# Patient Record
Sex: MALE | Race: BLACK OR AFRICAN AMERICAN | NOT HISPANIC OR LATINO | URBAN - METROPOLITAN AREA
[De-identification: names, ages, dates, MRNs, and addresses within clinical notes are randomized per-mention and may not be internally consistent; named-entity substitution may affect disease eponyms.]

---

## 2019-10-09 RX ORDER — AZITHROMYCIN 500 MG/1
1 TABLET, FILM COATED ORAL
Qty: 0 | Refills: 0 | DISCHARGE
Start: 2019-10-09

## 2019-10-11 ENCOUNTER — INPATIENT (INPATIENT)
Facility: HOSPITAL | Age: 44
LOS: 1 days | Discharge: HOME | DRG: 977 | End: 2019-10-13
Attending: INTERNAL MEDICINE | Admitting: INTERNAL MEDICINE
Payer: COMMERCIAL

## 2019-10-11 VITALS
HEART RATE: 102 BPM | TEMPERATURE: 103 F | OXYGEN SATURATION: 100 % | SYSTOLIC BLOOD PRESSURE: 119 MMHG | DIASTOLIC BLOOD PRESSURE: 76 MMHG | RESPIRATION RATE: 18 BRPM

## 2019-10-11 DIAGNOSIS — R50.9 FEVER, UNSPECIFIED: ICD-10-CM

## 2019-10-11 DIAGNOSIS — R63.8 OTHER SYMPTOMS AND SIGNS CONCERNING FOOD AND FLUID INTAKE: ICD-10-CM

## 2019-10-11 DIAGNOSIS — D64.9 ANEMIA, UNSPECIFIED: ICD-10-CM

## 2019-10-11 DIAGNOSIS — R65.10 SYSTEMIC INFLAMMATORY RESPONSE SYNDROME (SIRS) OF NON-INFECTIOUS ORIGIN WITHOUT ACUTE ORGAN DYSFUNCTION: ICD-10-CM

## 2019-10-11 LAB
APPEARANCE UR: CLEAR — SIGNIFICANT CHANGE UP
BILIRUB UR-MCNC: NEGATIVE — SIGNIFICANT CHANGE UP
COLOR SPEC: YELLOW — SIGNIFICANT CHANGE UP
CRP SERPL-MCNC: 0.77 MG/DL — HIGH (ref 0–0.4)
D DIMER BLD IA.RAPID-MCNC: 1576 NG/ML DDU — HIGH
DIFF PNL FLD: NEGATIVE — SIGNIFICANT CHANGE UP
ERYTHROCYTE [SEDIMENTATION RATE] IN BLOOD: 19 MM/HR — HIGH
FERRITIN SERPL-MCNC: 548 NG/ML — HIGH (ref 30–400)
FLU A RESULT: SIGNIFICANT CHANGE UP
FLU A RESULT: SIGNIFICANT CHANGE UP
FLUAV AG NPH QL: SIGNIFICANT CHANGE UP
FLUBV AG NPH QL: SIGNIFICANT CHANGE UP
GLUCOSE UR QL: NEGATIVE — SIGNIFICANT CHANGE UP
HETEROPH AB TITR SER AGGL: NEGATIVE — SIGNIFICANT CHANGE UP
IRON SATN MFR SERPL: 22 % — SIGNIFICANT CHANGE UP (ref 16–55)
IRON SATN MFR SERPL: 37 UG/DL — LOW (ref 45–165)
KETONES UR-MCNC: NEGATIVE — SIGNIFICANT CHANGE UP
LEUKOCYTE ESTERASE UR-ACNC: NEGATIVE — SIGNIFICANT CHANGE UP
NITRITE UR-MCNC: NEGATIVE — SIGNIFICANT CHANGE UP
PH UR: 7 — SIGNIFICANT CHANGE UP (ref 5–8)
PROT UR-MCNC: NEGATIVE MG/DL — SIGNIFICANT CHANGE UP
RETICS #: 50.9 K/UL — SIGNIFICANT CHANGE UP (ref 25–125)
RETICS/RBC NFR: 1.3 % — SIGNIFICANT CHANGE UP (ref 0.5–2.5)
RSV RESULT: SIGNIFICANT CHANGE UP
RSV RNA RESP QL NAA+PROBE: SIGNIFICANT CHANGE UP
S PYO AG SPEC QL IA: NEGATIVE — SIGNIFICANT CHANGE UP
SP GR SPEC: 1.01 — SIGNIFICANT CHANGE UP (ref 1–1.03)
TIBC SERPL-MCNC: 171 UG/DL — LOW (ref 220–430)
UIBC SERPL-MCNC: 134 UG/DL — SIGNIFICANT CHANGE UP (ref 110–370)
UROBILINOGEN FLD QL: 2 E.U./DL

## 2019-10-11 PROCEDURE — 99223 1ST HOSP IP/OBS HIGH 75: CPT | Mod: GC

## 2019-10-11 PROCEDURE — 71046 X-RAY EXAM CHEST 2 VIEWS: CPT | Mod: 26

## 2019-10-11 PROCEDURE — 99285 EMERGENCY DEPT VISIT HI MDM: CPT

## 2019-10-11 RX ORDER — AZITHROMYCIN 500 MG/1
500 TABLET, FILM COATED ORAL ONCE
Refills: 0 | Status: COMPLETED | OUTPATIENT
Start: 2019-10-11 | End: 2019-10-11

## 2019-10-11 RX ORDER — CEFTRIAXONE 500 MG/1
1000 INJECTION, POWDER, FOR SOLUTION INTRAMUSCULAR; INTRAVENOUS ONCE
Refills: 0 | Status: COMPLETED | OUTPATIENT
Start: 2019-10-11 | End: 2019-10-11

## 2019-10-11 RX ORDER — KETOROLAC TROMETHAMINE 30 MG/ML
30 SYRINGE (ML) INJECTION ONCE
Refills: 0 | Status: DISCONTINUED | OUTPATIENT
Start: 2019-10-11 | End: 2019-10-11

## 2019-10-11 RX ORDER — SODIUM CHLORIDE 9 MG/ML
2500 INJECTION INTRAMUSCULAR; INTRAVENOUS; SUBCUTANEOUS ONCE
Refills: 0 | Status: COMPLETED | OUTPATIENT
Start: 2019-10-11 | End: 2019-10-11

## 2019-10-11 RX ORDER — AZITHROMYCIN 500 MG/1
500 TABLET, FILM COATED ORAL EVERY 24 HOURS
Refills: 0 | Status: DISCONTINUED | OUTPATIENT
Start: 2019-10-12 | End: 2019-10-12

## 2019-10-11 RX ORDER — CALCIUM CARBONATE 500(1250)
3 TABLET ORAL EVERY 6 HOURS
Refills: 0 | Status: DISCONTINUED | OUTPATIENT
Start: 2019-10-11 | End: 2019-10-13

## 2019-10-11 RX ORDER — CEFTRIAXONE 500 MG/1
1000 INJECTION, POWDER, FOR SOLUTION INTRAMUSCULAR; INTRAVENOUS EVERY 24 HOURS
Refills: 0 | Status: DISCONTINUED | OUTPATIENT
Start: 2019-10-12 | End: 2019-10-12

## 2019-10-11 RX ORDER — ENOXAPARIN SODIUM 100 MG/ML
40 INJECTION SUBCUTANEOUS EVERY 24 HOURS
Refills: 0 | Status: DISCONTINUED | OUTPATIENT
Start: 2019-10-11 | End: 2019-10-12

## 2019-10-11 RX ORDER — ACETAMINOPHEN 500 MG
1000 TABLET ORAL ONCE
Refills: 0 | Status: COMPLETED | OUTPATIENT
Start: 2019-10-11 | End: 2019-10-11

## 2019-10-11 RX ORDER — POTASSIUM CHLORIDE 20 MEQ
40 PACKET (EA) ORAL EVERY 4 HOURS
Refills: 0 | Status: COMPLETED | OUTPATIENT
Start: 2019-10-11 | End: 2019-10-12

## 2019-10-11 RX ORDER — ACETAMINOPHEN 500 MG
650 TABLET ORAL EVERY 4 HOURS
Refills: 0 | Status: DISCONTINUED | OUTPATIENT
Start: 2019-10-11 | End: 2019-10-13

## 2019-10-11 RX ORDER — LANOLIN ALCOHOL/MO/W.PET/CERES
5 CREAM (GRAM) TOPICAL AT BEDTIME
Refills: 0 | Status: DISCONTINUED | OUTPATIENT
Start: 2019-10-11 | End: 2019-10-13

## 2019-10-11 RX ADMIN — AZITHROMYCIN 255 MILLIGRAM(S): 500 TABLET, FILM COATED ORAL at 19:35

## 2019-10-11 RX ADMIN — Medication 1000 MILLIGRAM(S): at 18:46

## 2019-10-11 RX ADMIN — SODIUM CHLORIDE 2500 MILLILITER(S): 9 INJECTION INTRAMUSCULAR; INTRAVENOUS; SUBCUTANEOUS at 16:51

## 2019-10-11 RX ADMIN — Medication 30 MILLIGRAM(S): at 18:46

## 2019-10-11 RX ADMIN — Medication 40 MILLIEQUIVALENT(S): at 22:54

## 2019-10-11 RX ADMIN — Medication 3 TABLET(S): at 22:55

## 2019-10-11 RX ADMIN — ENOXAPARIN SODIUM 40 MILLIGRAM(S): 100 INJECTION SUBCUTANEOUS at 22:54

## 2019-10-11 RX ADMIN — CEFTRIAXONE 100 MILLIGRAM(S): 500 INJECTION, POWDER, FOR SOLUTION INTRAMUSCULAR; INTRAVENOUS at 18:57

## 2019-10-11 RX ADMIN — Medication 30 MILLIGRAM(S): at 16:56

## 2019-10-11 RX ADMIN — CEFTRIAXONE 1000 MILLIGRAM(S): 500 INJECTION, POWDER, FOR SOLUTION INTRAMUSCULAR; INTRAVENOUS at 19:35

## 2019-10-11 RX ADMIN — Medication 1000 MILLIGRAM(S): at 16:55

## 2019-10-11 RX ADMIN — Medication 5 MILLIGRAM(S): at 22:54

## 2019-10-11 NOTE — H&P ADULT - ASSESSMENT
Mr Bae is a 45 yo M with fever of unknown origin. He has traveled in areas where tick exposure is possible, and he was treated, perhaps not adequately, for positive Streptococcus throat swab several weeks ago. He was exposed to steroids once, when he developed a rash after 10 days on Augmentin. Given his lack of localizing symptoms, and ongoing fever (102.9 oral), he is being admitted for SIRS and workup of fever of unknown origin.

## 2019-10-11 NOTE — ED PROVIDER NOTE - OBJECTIVE STATEMENT
44 year old male with no past medical history on no medications presents to ED with concern for fever intermittently x 6 weeks.  Patient notes he thought perhaps he had PNA and or strep throat and was prescribed 10 day course of amoxicillin without significant improvement in symptoms.  He was later prescribed azithromycin, which he has been taking without improvement in symptoms.   He notes prior throat discomfort, which he feels is now improved.  He denies associated headache, neck pain/stiffness, chills, chest pain, shortness of breath, cough, abdominal pain, nausea, emesis, changes to bowel movements, rashes, recent travel or additional acute complaints or concerns at this time.

## 2019-10-11 NOTE — H&P ADULT - HISTORY OF PRESENT ILLNESS
Mr. Bae is a 43 yo M with no significant past medical history who presents with a 6-week history of intermittent fever. He has had fevers at least every 2-3 days for the past 6 weeks. He does not recall any other symptoms, either when the fever first started, or throughout the duration. He presents now at the insistence of family because the fever has become high-grade and has been associated with fatigue and sweats. His only travel history was a trip to [place] which included being in wooded areas and on the beach. He has had no international travel in the last 10 years. He works from home. His only sick contacts during this duration were when his stepchildren caught Strept throat; he was swabbed and was Strept positive at the time, and was treated with at 10-day course of Augmentin. His fever seemed to improve on the Augmentin, but then he had a rash consistent with either hives or urticaria all over his body, at which point he stopped the Augmentin, and took a dose of prednisone, after which the rash disappeared. Two days ago, he was started on a course of azithromycin, which he has been taking without any improvement. Throughout the course of his illness, patient denies night sweats, weight loss, mouth sores or ulcers, sore throat, rhinorrhea, cough, sputum production, hemoptysis, nausea, vomiting, diarrhea, dysuria, genital lesions, or any other skin rashes or lesions. Mr. Bae is a 43 yo M with no significant past medical history who presents with a 6-week history of intermittent fever. He has had fevers at least every 2-3 days for the past 6 weeks. He does not recall any other symptoms, either when the fever first started, or throughout the duration. He presents now at the insistence of family because the fever has become high-grade and has been associated with fatigue and sweats. His only travel history was a trip to [place] which included being in wooded areas and on the beach. He has had no international travel in the last 10 years. He works from home. His only sick contacts during this duration were when his stepchildren caught Strept throat; he was swabbed and was Strept positive at the time, and was treated with at 10-day course of Augmentin. His fever seemed to improve on the Augmentin, but then he had a rash which spared face, palms and soles of feet, at which point he stopped the Augmentin, and took a dose of prednisone, after which the rash disappeared. Two days ago, he was started on a course of azithromycin, which he has been taking without any improvement. Throughout the course of his illness, patient denies night sweats, weight loss, mouth sores or ulcers, sore throat, rhinorrhea, cough, sputum production, hemoptysis, nausea, vomiting, diarrhea, dysuria, genital lesions, or any other skin rashes or lesions.     ED Course: Vitals 102.9 (oral), 102, 119/76, 18, 100% on RA. WBC 4.7, Hgb 12.3, K 3.3, UA +urobili but negative for UTI; rapid Strep, RSV, flu and mono screens all negative. CXR without consolidation. Received CTX/azithro, NS 2.5 L, Tylenol x1, Toradol 30 mg x1.

## 2019-10-11 NOTE — H&P ADULT - NSHPLABSRESULTS_GEN_ALL_CORE
.  LABS:                         12.3   4.72  )-----------( 171      ( 11 Oct 2019 15:52 )             36.6     10-11    141  |  101  |  14  ----------------------------<  91  3.3<L>   |  29  |  1.02    Ca    8.6      11 Oct 2019 15:53    TPro  6.9  /  Alb  3.9  /  TBili  0.7  /  DBili  x   /  AST  23  /  ALT  30  /  AlkPhos  54  10-11    PT/INR - ( 11 Oct 2019 15:52 )   PT: 13.2 sec;   INR: 1.16          PTT - ( 11 Oct 2019 15:52 )  PTT:29.4 sec  Urinalysis Basic - ( 11 Oct 2019 17:59 )    Color: Yellow / Appearance: Clear / S.015 / pH: x  Gluc: x / Ketone: NEGATIVE  / Bili: Negative / Urobili: 2.0 E.U./dL   Blood: x / Protein: NEGATIVE mg/dL / Nitrite: NEGATIVE   Leuk Esterase: NEGATIVE / RBC: x / WBC x   Sq Epi: x / Non Sq Epi: x / Bacteria: x            Lactate, Blood: 1.0 mmoL/L (10-11 @ 15:52)      RADIOLOGY, EKG & ADDITIONAL TESTS: None.

## 2019-10-11 NOTE — H&P ADULT - NSHPPHYSICALEXAM_GEN_ALL_CORE
VITAL SIGNS:  T(C): 36.8 (10-11-19 @ 20:54), Max: 39.4 (10-11-19 @ 15:21)  T(F): 98.3 (10-11-19 @ 20:54), Max: 102.9 (10-11-19 @ 15:21)  HR: 81 (10-11-19 @ 20:54) (81 - 102)  BP: 128/92 (10-11-19 @ 20:54) (108/63 - 128/92)  BP(mean): --  RR: 16 (10-11-19 @ 20:54) (16 - 18)  SpO2: 100% (10-11-19 @ 20:54) (100% - 100%)  Wt(kg): --    PHYSICAL EXAM:    Constitutional: Adult Black male, beads of sweat on brow, lying in bed comfortably; in no acute distress  Head: NC/AT  Eyes: PERRL, EOMI, anicteric sclera  ENT: MMM  Neck: no JVD  Respiratory: CTA B/L; no W/R/R  Cardiac: RRR  Gastrointestinal: soft, NT/ND  Extremities: WWP; no peripheral edema  Musculoskeletal: no joint swelling, tenderness or erythema  Dermatologic: skin warm, dry and intact; no rash  Neurologic: AAOx3; no gross focal deficits of cranial nerves; moves extremities x4 to command  Psychiatric: affect and characteristics of appearance, verbalizations, behaviors are appropriate

## 2019-10-11 NOTE — ED ADULT NURSE NOTE - OBJECTIVE STATEMENT
The pt is a 43 y/o male who came in to ED for evaluation of fever for a few days. Pt reports that he was being treated for strep and developed an allergy to amoxicillin, Pt then was placed on azithromycin and has not improved, pt presents febrile at triage at 102 and tachycardic at 102 bpm. Sepsis work up done.

## 2019-10-11 NOTE — ED ADULT TRIAGE NOTE - ARRIVAL INFO ADDITIONAL COMMENTS
Pt walked into ED with c/o of sorethroat, cough with yellow phlegm and fever. Onset was 6 weeks ago. Pt was treated with penicillin and broke out with rash, they switched to azithromycin with no relief of fever. Onset was for 6 weeks. Denies CP, SOB, neck stiffness, rash, pain, N+V+D. airway is intact. Denies traveling out of the state. Denies medical problems. Last tylenol last night

## 2019-10-11 NOTE — H&P ADULT - PROBLEM SELECTOR PLAN 4
F: s/p 2.5 L in ED  E: repleted K, replete PRN  N: regular diet po    DVT ppx: Lovenox qd  GI ppx: none    Dispo: [facility]  Code Status: Full Code

## 2019-10-11 NOTE — H&P ADULT - ATTENDING COMMENTS
Pt presenting with daily persistent fevers x6w and fatigue without other localizing symptoms. Pan review of systems negative. Reports recently being diagnosed with strep pharyngitis for which he took a course of a penicillin and discontinued on day 8 after developing a diffuse rash (which has since resolved). No recent travel. Febrile here on presentation to 39.4C, otherwise vitals stable. Exam benign except for mildly . Initial labs wihtout leukocytosis, UA negative. RVP negative. Rapid strep test negative. EBV monospot test negative. ESR/CRP mildly elevated. BMP/LFTs wnl. Ddimer was checked and elevated to 1576, subsequent CTPE negative for PE or infiltrates/effusions. CTA/P w/ IV contrast was without acute pathology (prelim read, there are cystic lesions in bilateral kidneys as well per my read, no obvious hepatosplenomegaly).     A/P  // FUO: some suspicion for CMV mononucleosis given rash after beta-lactam treatment  -HIV Ab, VL  -Peripheral smear  -Tick-bourne panel  -CMV serology  -BCx x2  -EKG  -PPD  -YOLANDA/RF    Rest of plan per residents note

## 2019-10-11 NOTE — H&P ADULT - PROBLEM SELECTOR PLAN 1
No leukocytosis. CXR, UA unrevealing. Swabs for RVP, flu, RSV and Strept negative. BCx sent x2.  - f/u mono screen, group A strept Cx sent in ED  - f/u HIV, acute hepatitis panel, tickborne illness panel, syphilis screen  - f/u EKG in setting of possible Lyme disease and recent Strept infection No leukocytosis. CXR, UA unrevealing. Swabs for RVP, flu, RSV, Strep and mono negative. BCx sent x2.  - f/u group A strept Cx sent in ED  - f/u HIV (CMIA and viral load), tickborne illness panel, syphilis screen, CMV IgG/IgM/PCR, EBV screen  - f/u EKG in setting of both possible Lyme disease as well as recent Strep infection

## 2019-10-11 NOTE — ED ADULT NURSE NOTE - NSIMPLEMENTINTERV_GEN_ALL_ED
Implemented All Universal Safety Interventions:  [place] to call system. Call bell, personal items and telephone within reach. Instruct patient to call for assistance. Room bathroom lighting operational. Non-slip footwear when patient is off stretcher. Physically safe environment: no spills, clutter or unnecessary equipment. Stretcher in lowest position, wheels locked, appropriate side rails in place.

## 2019-10-11 NOTE — H&P ADULT - NSHPSOCIALHISTORY_GEN_ALL_CORE
Lives at home with wife and stepchildren    Denies tobacco use  Denies alcohol abuse  Denies illicit drug use

## 2019-10-11 NOTE — H&P ADULT - PROBLEM SELECTOR PLAN 3
Labs notable with mild normocytic anemia (Hgb 12.9) with no increase in RDW. UA with +urobili.   - f/u iron studies, reticulocyte count

## 2019-10-11 NOTE — ED PROVIDER NOTE - ENMT, MLM
Airway patent, Nasal mucosa clear. Mouth with normal mucosa. Mild erythema to posterior oropharynx.  Throat has no vesicles, no oropharyngeal exudates and uvula is midline.

## 2019-10-11 NOTE — ED PROVIDER NOTE - CLINICAL SUMMARY MEDICAL DECISION MAKING FREE TEXT BOX
Patient in ED w concern for 6 weeks of ongoing fever despite treatment for suspected respiratory symptoms with amoxicillin and later azithromycin.  Patient noted to be febrile to 102 on arrival to ED today.  He is given IVF, antipyretic and CXR reviewed.  Concern for possible right sided infiltrate, patient is covered with CAP IV abx given no other clear source of infection.  Will plan for overnight admission with IV abx and close monitoring.  Plan is discussed with patient and family at bedside, all of whom are in agreement.  Will admit at this time.

## 2019-10-12 LAB
ANION GAP SERPL CALC-SCNC: 6 MMOL/L — SIGNIFICANT CHANGE UP (ref 5–17)
BASOPHILS # BLD AUTO: 0.01 K/UL — SIGNIFICANT CHANGE UP (ref 0–0.2)
BASOPHILS NFR BLD AUTO: 0.3 % — SIGNIFICANT CHANGE UP (ref 0–2)
BUN SERPL-MCNC: 12 MG/DL — SIGNIFICANT CHANGE UP (ref 7–23)
CALCIUM SERPL-MCNC: 8.2 MG/DL — LOW (ref 8.4–10.5)
CHLORIDE SERPL-SCNC: 107 MMOL/L — SIGNIFICANT CHANGE UP (ref 96–108)
CMV IGG FLD QL: 1.1 U/ML — HIGH
CMV IGG SERPL-IMP: POSITIVE
CMV IGM FLD-ACNC: <8 AU/ML — SIGNIFICANT CHANGE UP
CMV IGM SERPL QL: NEGATIVE — SIGNIFICANT CHANGE UP
CO2 SERPL-SCNC: 27 MMOL/L — SIGNIFICANT CHANGE UP (ref 22–31)
CREAT SERPL-MCNC: 0.82 MG/DL — SIGNIFICANT CHANGE UP (ref 0.5–1.3)
EBV EA AB SER IA-ACNC: 46.1 U/ML — HIGH
EBV EA AB TITR SER IF: NEGATIVE — SIGNIFICANT CHANGE UP
EBV EA IGG SER-ACNC: POSITIVE
EBV NA IGG SER IA-ACNC: <3 U/ML — SIGNIFICANT CHANGE UP
EBV PATRN SPEC IB-IMP: SIGNIFICANT CHANGE UP
EBV VCA IGG AVIDITY SER QL IA: POSITIVE
EBV VCA IGM SER IA-ACNC: 234 U/ML — HIGH
EBV VCA IGM SER IA-ACNC: <10 U/ML — SIGNIFICANT CHANGE UP
EBV VCA IGM TITR FLD: NEGATIVE — SIGNIFICANT CHANGE UP
EOSINOPHIL # BLD AUTO: 0.24 K/UL — SIGNIFICANT CHANGE UP (ref 0–0.5)
EOSINOPHIL NFR BLD AUTO: 7 % — HIGH (ref 0–6)
GLUCOSE SERPL-MCNC: 83 MG/DL — SIGNIFICANT CHANGE UP (ref 70–99)
HCT VFR BLD CALC: 32 % — LOW (ref 39–50)
HGB BLD-MCNC: 11 G/DL — LOW (ref 13–17)
HIV 1+2 AB+HIV1 P24 AG SERPL QL IA: REACTIVE
IMM GRANULOCYTES NFR BLD AUTO: 0.3 % — SIGNIFICANT CHANGE UP (ref 0–1.5)
LYMPHOCYTES # BLD AUTO: 0.83 K/UL — LOW (ref 1–3.3)
LYMPHOCYTES # BLD AUTO: 24.1 % — SIGNIFICANT CHANGE UP (ref 13–44)
MAGNESIUM SERPL-MCNC: 2.1 MG/DL — SIGNIFICANT CHANGE UP (ref 1.6–2.6)
MCHC RBC-ENTMCNC: 32.4 PG — SIGNIFICANT CHANGE UP (ref 27–34)
MCHC RBC-ENTMCNC: 34.4 GM/DL — SIGNIFICANT CHANGE UP (ref 32–36)
MCV RBC AUTO: 94.1 FL — SIGNIFICANT CHANGE UP (ref 80–100)
MONOCYTES # BLD AUTO: 0.53 K/UL — SIGNIFICANT CHANGE UP (ref 0–0.9)
MONOCYTES NFR BLD AUTO: 15.4 % — HIGH (ref 2–14)
NEUTROPHILS # BLD AUTO: 1.83 K/UL — SIGNIFICANT CHANGE UP (ref 1.8–7.4)
NEUTROPHILS NFR BLD AUTO: 52.9 % — SIGNIFICANT CHANGE UP (ref 43–77)
NRBC # BLD: 0 /100 WBCS — SIGNIFICANT CHANGE UP (ref 0–0)
PLATELET # BLD AUTO: 139 K/UL — LOW (ref 150–400)
POTASSIUM SERPL-MCNC: 4.1 MMOL/L — SIGNIFICANT CHANGE UP (ref 3.5–5.3)
POTASSIUM SERPL-SCNC: 4.1 MMOL/L — SIGNIFICANT CHANGE UP (ref 3.5–5.3)
RBC # BLD: 3.4 M/UL — LOW (ref 4.2–5.8)
RBC # FLD: 12.2 % — SIGNIFICANT CHANGE UP (ref 10.3–14.5)
SODIUM SERPL-SCNC: 140 MMOL/L — SIGNIFICANT CHANGE UP (ref 135–145)
T PALLIDUM AB TITR SER: NEGATIVE — SIGNIFICANT CHANGE UP
WBC # BLD: 3.45 K/UL — LOW (ref 3.8–10.5)
WBC # FLD AUTO: 3.45 K/UL — LOW (ref 3.8–10.5)

## 2019-10-12 PROCEDURE — 99233 SBSQ HOSP IP/OBS HIGH 50: CPT | Mod: GC

## 2019-10-12 PROCEDURE — 71275 CT ANGIOGRAPHY CHEST: CPT | Mod: 26

## 2019-10-12 PROCEDURE — 74177 CT ABD & PELVIS W/CONTRAST: CPT | Mod: 26

## 2019-10-12 RX ADMIN — Medication 40 MILLIEQUIVALENT(S): at 01:25

## 2019-10-12 NOTE — PROGRESS NOTE ADULT - SUBJECTIVE AND OBJECTIVE BOX
OVERNIGHT EVENTS:    SUBJECTIVE / INTERVAL HPI: Patient seen and examined at bedside.     VITAL SIGNS:  Vital Signs Last 24 Hrs  T(C): 36.6 (12 Oct 2019 05:44), Max: 39.4 (11 Oct 2019 15:21)  T(F): 97.8 (12 Oct 2019 05:44), Max: 102.9 (11 Oct 2019 15:21)  HR: 65 (12 Oct 2019 05:44) (59 - 102)  BP: 101/63 (12 Oct 2019 05:44) (101/63 - 128/92)  BP(mean): --  RR: 17 (12 Oct 2019 05:44) (16 - 18)  SpO2: 98% (12 Oct 2019 05:44) (98% - 100%)    PHYSICAL EXAM:    General: WDWN  HEENT: NC/AT; PERRL, anicteric sclera; MMM  Neck: supple  Cardiovascular: +S1/S2; RRR  Respiratory: CTA B/L; no W/R/R  Gastrointestinal: soft, NT/ND; +BSx4  Extremities: WWP; no edema, clubbing or cyanosis  Vascular: 2+ radial, DP/PT pulses B/L  Neurological: AAOx3; no focal deficits    MEDICATIONS:  MEDICATIONS  (STANDING):  enoxaparin Injectable 40 milliGRAM(s) SubCutaneous every 24 hours    MEDICATIONS  (PRN):  acetaminophen   Tablet .. 650 milliGRAM(s) Oral every 4 hours PRN Temp greater or equal to 38C (100.4F), Mild Pain (1 - 3)  calcium carbonate    500 mG (Tums) Chewable 3 Tablet(s) Chew every 6 hours PRN Dyspepsia  melatonin 5 milliGRAM(s) Oral at bedtime PRN Sleep      ALLERGIES:  Allergies    No Known Allergies    Intolerances        LABS:                        11.0   3.45  )-----------( 139      ( 12 Oct 2019 06:23 )             32.0     10-12    140  |  107  |  12  ----------------------------<  83  4.1   |  27  |  0.82    Ca    8.2<L>      12 Oct 2019 06:23  Mg     2.1     10-12    TPro  6.9  /  Alb  3.9  /  TBili  0.7  /  DBili  x   /  AST  23  /  ALT  30  /  AlkPhos  54  10-11    PT/INR - ( 11 Oct 2019 15:52 )   PT: 13.2 sec;   INR: 1.16          PTT - ( 11 Oct 2019 15:52 )  PTT:29.4 sec  Urinalysis Basic - ( 11 Oct 2019 17:59 )    Color: Yellow / Appearance: Clear / S.015 / pH: x  Gluc: x / Ketone: NEGATIVE  / Bili: Negative / Urobili: 2.0 E.U./dL   Blood: x / Protein: NEGATIVE mg/dL / Nitrite: NEGATIVE   Leuk Esterase: NEGATIVE / RBC: x / WBC x   Sq Epi: x / Non Sq Epi: x / Bacteria: x      CAPILLARY BLOOD GLUCOSE          RADIOLOGY & ADDITIONAL TESTS: Reviewed. SUBJECTIVE / INTERVAL HPI: Patient seen and examined at bedside. Patient resting in bed. Feels well, denies chest pain, sob, nausea, vomiting, abdominal pain, dysuria, diarrhea, constipation.     VITAL SIGNS:  Vital Signs Last 24 Hrs  T(C): 36.6 (12 Oct 2019 05:44), Max: 39.4 (11 Oct 2019 15:21)  T(F): 97.8 (12 Oct 2019 05:44), Max: 102.9 (11 Oct 2019 15:21)  HR: 65 (12 Oct 2019 05:44) (59 - 102)  BP: 101/63 (12 Oct 2019 05:44) (101/63 - 128/92)  BP(mean): --  RR: 17 (12 Oct 2019 05:44) (16 - 18)  SpO2: 98% (12 Oct 2019 05:44) (98% - 100%)    PHYSICAL EXAM:    General: WDWN  HEENT: NC/AT; PERRL, anicteric sclera; MMM  Neck: supple  Cardiovascular: +S1/S2; RRR  Respiratory: CTA B/L; no W/R/R  Gastrointestinal: soft, NT/ND; +BSx4  Extremities: WWP; no edema, clubbing or cyanosis  Vascular: 2+ radial, DP/PT pulses B/L  Neurological: AAOx3; no focal deficits    MEDICATIONS:  MEDICATIONS  (STANDING):  enoxaparin Injectable 40 milliGRAM(s) SubCutaneous every 24 hours    MEDICATIONS  (PRN):  acetaminophen   Tablet .. 650 milliGRAM(s) Oral every 4 hours PRN Temp greater or equal to 38C (100.4F), Mild Pain (1 - 3)  calcium carbonate    500 mG (Tums) Chewable 3 Tablet(s) Chew every 6 hours PRN Dyspepsia  melatonin 5 milliGRAM(s) Oral at bedtime PRN Sleep      ALLERGIES:  Allergies    No Known Allergies    Intolerances        LABS:                        11.0   3.45  )-----------( 139      ( 12 Oct 2019 06:23 )             32.0     1012    140  |  107  |  12  ----------------------------<  83  4.1   |  27  |  0.82    Ca    8.2<L>      12 Oct 2019 06:23  Mg     2.1     10-12    TPro  6.9  /  Alb  3.9  /  TBili  0.7  /  DBili  x   /  AST  23  /  ALT  30  /  AlkPhos  54  10-11    PT/INR - ( 11 Oct 2019 15:52 )   PT: 13.2 sec;   INR: 1.16          PTT - ( 11 Oct 2019 15:52 )  PTT:29.4 sec  Urinalysis Basic - ( 11 Oct 2019 17:59 )    Color: Yellow / Appearance: Clear / S.015 / pH: x  Gluc: x / Ketone: NEGATIVE  / Bili: Negative / Urobili: 2.0 E.U./dL   Blood: x / Protein: NEGATIVE mg/dL / Nitrite: NEGATIVE   Leuk Esterase: NEGATIVE / RBC: x / WBC x   Sq Epi: x / Non Sq Epi: x / Bacteria: x        RADIOLOGY & ADDITIONAL TESTS: Reviewed.

## 2019-10-12 NOTE — PROGRESS NOTE ADULT - ASSESSMENT
Mr Bae is a 43 yo M with fever of unknown origin. He has traveled in areas where tick exposure is possible, and he was treated, perhaps not adequately, for positive Streptococcus throat swab several weeks ago. He was exposed to steroids once, when he developed a rash after 10 days on Augmentin. Given his lack of localizing symptoms, and ongoing fever (102.9 oral), he is being admitted for SIRS and workup of fever of unknown origin.

## 2019-10-12 NOTE — PROGRESS NOTE ADULT - PROBLEM SELECTOR PLAN 1
No leukocytosis. CXR, UA unrevealing. Swabs for RVP, flu, RSV, Strep and mono negative. BCx sent x2.  -Rapid Stept negative  -RVP, Flu, RSV, EBV negative  - f/u group A strept Cx sent in ED  - HIV reactive  - F/u tickborne illness panel, syphilis screen, CMV IgG/IgM/PCR screen  - f/u EKG in setting of both possible Lyme disease as well as recent Strep infection

## 2019-10-12 NOTE — PROGRESS NOTE ADULT - PROBLEM SELECTOR PLAN 2
2/4 SIRS criteria (fever 102.9 oral, )  - s/p 2.5L NS, CTX/azithro, Tylenol, and Toradol in ED  - continue Tylenol PRN for fever  - will hold off on additional abx at this time given there is no clear source of infection  - ESR 19, CRP 0.77 both elevated; D-dimer elevated

## 2019-10-12 NOTE — PROGRESS NOTE ADULT - PROBLEM SELECTOR PLAN 3
Labs notable with mild normocytic anemia (Hgb 12.9) with no increase in RDW. UA with +urobili.   - iron studies with decreased iron/TIBC, increased ferritin, likely 2/2 to inflammatory/infectious process   - reticulocyte count wnl

## 2019-10-12 NOTE — PROGRESS NOTE ADULT - ATTENDING COMMENTS
Pt seen and examined at bedside.  No new complaints, denies fevers today.    Agree with A and P as above    1) FUO - likely 2/2 HIV, newly dx.  Awaiting confirmation and VL, CD4  - consult ID pending confirmatory testing  - infectious w/u including CXR, CT abd, UA, negative, blood cx negative to date

## 2019-10-13 VITALS
HEART RATE: 71 BPM | RESPIRATION RATE: 16 BRPM | DIASTOLIC BLOOD PRESSURE: 82 MMHG | TEMPERATURE: 98 F | SYSTOLIC BLOOD PRESSURE: 124 MMHG | OXYGEN SATURATION: 98 %

## 2019-10-13 LAB
ANION GAP SERPL CALC-SCNC: 9 MMOL/L — SIGNIFICANT CHANGE UP (ref 5–17)
ANISOCYTOSIS BLD QL: SLIGHT — SIGNIFICANT CHANGE UP
BUN SERPL-MCNC: 8 MG/DL — SIGNIFICANT CHANGE UP (ref 7–23)
CALCIUM SERPL-MCNC: 8.3 MG/DL — LOW (ref 8.4–10.5)
CHLORIDE SERPL-SCNC: 106 MMOL/L — SIGNIFICANT CHANGE UP (ref 96–108)
CO2 SERPL-SCNC: 26 MMOL/L — SIGNIFICANT CHANGE UP (ref 22–31)
CREAT SERPL-MCNC: 0.78 MG/DL — SIGNIFICANT CHANGE UP (ref 0.5–1.3)
CULTURE RESULTS: SIGNIFICANT CHANGE UP
EOSINOPHIL NFR BLD AUTO: 6 % — SIGNIFICANT CHANGE UP (ref 0–6)
GLUCOSE SERPL-MCNC: 82 MG/DL — SIGNIFICANT CHANGE UP (ref 70–99)
HCT VFR BLD CALC: 32.6 % — LOW (ref 39–50)
HGB BLD-MCNC: 11.2 G/DL — LOW (ref 13–17)
LYMPHOCYTES # BLD AUTO: 18 % — SIGNIFICANT CHANGE UP (ref 13–44)
MACROCYTES BLD QL: SLIGHT — SIGNIFICANT CHANGE UP
MANUAL SMEAR VERIFICATION: SIGNIFICANT CHANGE UP
MCHC RBC-ENTMCNC: 32.5 PG — SIGNIFICANT CHANGE UP (ref 27–34)
MCHC RBC-ENTMCNC: 34.4 GM/DL — SIGNIFICANT CHANGE UP (ref 32–36)
MCV RBC AUTO: 94.5 FL — SIGNIFICANT CHANGE UP (ref 80–100)
MICROCYTES BLD QL: SLIGHT — SIGNIFICANT CHANGE UP
MONOCYTES NFR BLD AUTO: 8 % — SIGNIFICANT CHANGE UP (ref 2–14)
NEUTROPHILS NFR BLD AUTO: 68 % — SIGNIFICANT CHANGE UP (ref 43–77)
NRBC # BLD: 0 /100 WBCS — SIGNIFICANT CHANGE UP (ref 0–0)
OVALOCYTES BLD QL SMEAR: SLIGHT — SIGNIFICANT CHANGE UP
PLAT MORPH BLD: ABNORMAL
PLATELET # BLD AUTO: 139 K/UL — LOW (ref 150–400)
POTASSIUM SERPL-MCNC: 3.8 MMOL/L — SIGNIFICANT CHANGE UP (ref 3.5–5.3)
POTASSIUM SERPL-SCNC: 3.8 MMOL/L — SIGNIFICANT CHANGE UP (ref 3.5–5.3)
RBC # BLD: 3.45 M/UL — LOW (ref 4.2–5.8)
RBC # FLD: 12.3 % — SIGNIFICANT CHANGE UP (ref 10.3–14.5)
RBC BLD AUTO: ABNORMAL
SODIUM SERPL-SCNC: 141 MMOL/L — SIGNIFICANT CHANGE UP (ref 135–145)
SPECIMEN SOURCE: SIGNIFICANT CHANGE UP
WBC # BLD: 3.21 K/UL — LOW (ref 3.8–10.5)
WBC # FLD AUTO: 3.21 K/UL — LOW (ref 3.8–10.5)

## 2019-10-13 PROCEDURE — 80053 COMPREHEN METABOLIC PANEL: CPT

## 2019-10-13 PROCEDURE — 85379 FIBRIN DEGRADATION QUANT: CPT

## 2019-10-13 PROCEDURE — 86644 CMV ANTIBODY: CPT

## 2019-10-13 PROCEDURE — 87081 CULTURE SCREEN ONLY: CPT

## 2019-10-13 PROCEDURE — 99239 HOSP IP/OBS DSCHRG MGMT >30: CPT | Mod: GC

## 2019-10-13 PROCEDURE — 83550 IRON BINDING TEST: CPT

## 2019-10-13 PROCEDURE — 71046 X-RAY EXAM CHEST 2 VIEWS: CPT

## 2019-10-13 PROCEDURE — 82728 ASSAY OF FERRITIN: CPT

## 2019-10-13 PROCEDURE — 85652 RBC SED RATE AUTOMATED: CPT

## 2019-10-13 PROCEDURE — 85025 COMPLETE CBC W/AUTO DIFF WBC: CPT

## 2019-10-13 PROCEDURE — 87581 M.PNEUMON DNA AMP PROBE: CPT

## 2019-10-13 PROCEDURE — 85045 AUTOMATED RETICULOCYTE COUNT: CPT

## 2019-10-13 PROCEDURE — 96365 THER/PROPH/DIAG IV INF INIT: CPT

## 2019-10-13 PROCEDURE — 86780 TREPONEMA PALLIDUM: CPT

## 2019-10-13 PROCEDURE — 96375 TX/PRO/DX INJ NEW DRUG ADDON: CPT

## 2019-10-13 PROCEDURE — 86664 EPSTEIN-BARR NUCLEAR ANTIGEN: CPT

## 2019-10-13 PROCEDURE — 36415 COLL VENOUS BLD VENIPUNCTURE: CPT

## 2019-10-13 PROCEDURE — 86645 CMV ANTIBODY IGM: CPT

## 2019-10-13 PROCEDURE — 85027 COMPLETE CBC AUTOMATED: CPT

## 2019-10-13 PROCEDURE — 83735 ASSAY OF MAGNESIUM: CPT

## 2019-10-13 PROCEDURE — 86140 C-REACTIVE PROTEIN: CPT

## 2019-10-13 PROCEDURE — 86663 EPSTEIN-BARR ANTIBODY: CPT

## 2019-10-13 PROCEDURE — 87040 BLOOD CULTURE FOR BACTERIA: CPT

## 2019-10-13 PROCEDURE — 87536 HIV-1 QUANT&REVRSE TRNSCRPJ: CPT

## 2019-10-13 PROCEDURE — 86753 PROTOZOA ANTIBODY NOS: CPT

## 2019-10-13 PROCEDURE — 83540 ASSAY OF IRON: CPT

## 2019-10-13 PROCEDURE — 87798 DETECT AGENT NOS DNA AMP: CPT

## 2019-10-13 PROCEDURE — 86701 HIV-1ANTIBODY: CPT

## 2019-10-13 PROCEDURE — 87486 CHLMYD PNEUM DNA AMP PROBE: CPT

## 2019-10-13 PROCEDURE — 87880 STREP A ASSAY W/OPTIC: CPT

## 2019-10-13 PROCEDURE — 86308 HETEROPHILE ANTIBODY SCREEN: CPT

## 2019-10-13 PROCEDURE — 86665 EPSTEIN-BARR CAPSID VCA: CPT

## 2019-10-13 PROCEDURE — 87086 URINE CULTURE/COLONY COUNT: CPT

## 2019-10-13 PROCEDURE — 87631 RESP VIRUS 3-5 TARGETS: CPT

## 2019-10-13 PROCEDURE — 87389 HIV-1 AG W/HIV-1&-2 AB AG IA: CPT

## 2019-10-13 PROCEDURE — 74177 CT ABD & PELVIS W/CONTRAST: CPT

## 2019-10-13 PROCEDURE — 81003 URINALYSIS AUTO W/O SCOPE: CPT

## 2019-10-13 PROCEDURE — 85610 PROTHROMBIN TIME: CPT

## 2019-10-13 PROCEDURE — 86702 HIV-2 ANTIBODY: CPT

## 2019-10-13 PROCEDURE — 80048 BASIC METABOLIC PNL TOTAL CA: CPT

## 2019-10-13 PROCEDURE — 71275 CT ANGIOGRAPHY CHEST: CPT

## 2019-10-13 PROCEDURE — 85730 THROMBOPLASTIN TIME PARTIAL: CPT

## 2019-10-13 PROCEDURE — 87633 RESP VIRUS 12-25 TARGETS: CPT

## 2019-10-13 PROCEDURE — 99285 EMERGENCY DEPT VISIT HI MDM: CPT | Mod: 25

## 2019-10-13 PROCEDURE — 86666 EHRLICHIA ANTIBODY: CPT

## 2019-10-13 PROCEDURE — 86618 LYME DISEASE ANTIBODY: CPT

## 2019-10-13 PROCEDURE — 83605 ASSAY OF LACTIC ACID: CPT

## 2019-10-13 NOTE — DISCHARGE NOTE NURSING/CASE MANAGEMENT/SOCIAL WORK - PATIENT PORTAL LINK FT
You can access the FollowMyHealth Patient Portal offered by [facility] by registering at the following website: [email or web address]. By joining [facility]’s FollowMyHealth portal, you will also be able to view your health information using other applications (apps) compatible with our system.

## 2019-10-13 NOTE — DISCHARGE NOTE PROVIDER - CARE PROVIDER_API CALL
Maral Patel)  Internal Medicine  [address] [address]  Phone: [phone]  Fax: [phone]  Follow Up Time: 1-3 days

## 2019-10-13 NOTE — DISCHARGE NOTE PROVIDER - NSDCCPCAREPLAN_GEN_ALL_CORE_FT
PRINCIPAL DISCHARGE DIAGNOSIS  Diagnosis: Fever of unknown origin with HIV infection  Assessment and Plan of Treatment: You presented with persistent fevers, it was unclear where they were coming from. We did a full infectious workup, your blood cultures, urine cultures, rapid viral panel, strep culture, all came back negative. We also tested HIV, which was positive in the screening test.      SECONDARY DISCHARGE DIAGNOSES  Diagnosis: Fever of unknown origin  Assessment and Plan of Treatment: You presented with persistent fevers, it was unclear where they were coming from. We did a full infectious workup, your blood cultures, urine cultures, rapid viral panel, strep culture, all came back negative. We also tested HIV, which was positive in the screening test. PRINCIPAL DISCHARGE DIAGNOSIS  Diagnosis: Fever of unknown origin with HIV infection  Assessment and Plan of Treatment: You presented with persistent fevers, it was unclear where they were coming from. We did a full infectious workup, your blood cultures, urine cultures, rapid viral panel, strep culture, all came back negative. We also tested HIV, which was positive in the screening test. It is very important that you follow up with Dr. Patel, the information for her office has been listed in this discharge summary. We will call you as well once we get more of your results back.      SECONDARY DISCHARGE DIAGNOSES  Diagnosis: Fever of unknown origin  Assessment and Plan of Treatment: You presented with persistent fevers, it was unclear where they were coming from. We did a full infectious workup, your blood cultures, urine cultures, rapid viral panel, strep culture, all came back negative. We also tested HIV, which was positive in the screening test.

## 2019-10-13 NOTE — DISCHARGE NOTE PROVIDER - HOSPITAL COURSE
44M with PMH fevers of unknown etiology and no other significant past medical history presented with persistent fevers over the past few weeks        Problem List/Main Diagnoses (system-based):     1. Fevers of unknown etiology- Various labs were sent including rapid strep, RVP, Blood cultures which were all negative. Pt was found to have newly diagnosed HIV (on the screening test). Pt remained afebrile during hospital stay, and remained hemodynamically stable.         2. Newly diagnosed HIV - positive screening test for HIV. Unclear exposures, pt denies any new sexual contact, and has been with current partner for >13 years. Pt's spouse was also tested and was negative for HIV.         3. Anemia, leukopenia, thrombocytopenia- Pt's iron studies were c/w inflammatory/infectious process, reticulocyte count was within normal limits.         Inpatient treatment course:     New medications: no new medications at this time.         Labs to be followed outpatient:     HIV confirmatory test    T Cell subset    HIV viral load    Pt should have repeat CBC         Exam to be followed outpatient:     Comprehensive physical exam.

## 2019-10-14 LAB
4/8 RATIO: 0.78 RATIO — LOW (ref 0.9–3.6)
ABS CD8: 304 /UL — SIGNIFICANT CHANGE UP (ref 142–740)
CD16+CD56+ CELLS NFR BLD: 14 % — SIGNIFICANT CHANGE UP (ref 5–23)
CD16+CD56+ CELLS NFR SPEC: 108 /UL — SIGNIFICANT CHANGE UP (ref 71–410)
CD19 BLASTS SPEC-ACNC: 69 /UL — LOW (ref 84–469)
CD19 BLASTS SPEC-ACNC: 9 % — SIGNIFICANT CHANGE UP (ref 6–24)
CD3 BLASTS SPEC-ACNC: 564 /UL — LOW (ref 672–1870)
CD3 BLASTS SPEC-ACNC: 74 % — SIGNIFICANT CHANGE UP (ref 59–83)
CD4 %: 31 % — SIGNIFICANT CHANGE UP (ref 30–62)
CD8 %: 40 % — HIGH (ref 12–36)
CMV DNA CSF QL NAA+PROBE: SIGNIFICANT CHANGE UP
CMV DNA SPEC NAA+PROBE-LOG#: SIGNIFICANT CHANGE UP LOGIU/ML
HIV-1 VIRAL LOAD RESULT: ABNORMAL
HIV1 RNA # SERPL NAA+PROBE: SIGNIFICANT CHANGE UP
HIV1 RNA SER-IMP: SIGNIFICANT CHANGE UP
HIV1 RNA SERPL NAA+PROBE-ACNC: ABNORMAL
HIV1 RNA SERPL NAA+PROBE-LOG#: 6.48 — SIGNIFICANT CHANGE UP
T-CELL CD4 SUBSET PNL BLD: 238 /UL — LOW (ref 489–1457)

## 2019-10-15 LAB
A PHAGOCYTOPH IGG TITR SER IF: SIGNIFICANT CHANGE UP TITER
B BURGDOR AB SER QL IA: NEGATIVE — SIGNIFICANT CHANGE UP
B MICROTI IGG TITR SER: SIGNIFICANT CHANGE UP TITER
E CHAFFEENSIS IGG TITR SER IF: SIGNIFICANT CHANGE UP TITER

## 2019-10-15 NOTE — CHART NOTE - NSCHARTNOTEFT_GEN_A_CORE
called by [place] re: +HIV ;  pt dcd on 10/13; per notes, pt w/ newly dxd HIV, dc note details that pt was made aware of HIV diagnosis.

## 2019-10-16 DIAGNOSIS — B20 HUMAN IMMUNODEFICIENCY VIRUS [HIV] DISEASE: ICD-10-CM

## 2019-10-16 DIAGNOSIS — R50.9 FEVER, UNSPECIFIED: ICD-10-CM

## 2019-10-16 DIAGNOSIS — D64.9 ANEMIA, UNSPECIFIED: ICD-10-CM

## 2023-03-16 NOTE — DISCHARGE NOTE NURSING/CASE MANAGEMENT/SOCIAL WORK - NURSING SECTION COMPLETE
Please see other telephone encounter from 03.14.23   Patient/Caregiver provided printed discharge information.

## 2023-04-18 NOTE — H&P ADULT - PROBLEM SELECTOR PLAN 2
n/a
2/4 SIRS criteria (fever 102.9 oral, )  - s/p 2.5L NS, CTX/azithro, Tylenol, and Toradol in ED  - continue Tylenol PRN for fever  - will hold off on additional abx at this time given there is no clear source of infection  - f/u D-dimer, ESR, CRP

## 2023-05-18 NOTE — ED ADULT TRIAGE NOTE - NS ED NURSE BANDS TYPE
[de-identified] : [id number]   oExp: 2025-08-31 
Name band;

## 2024-10-15 NOTE — PATIENT PROFILE ADULT - NSPROPOAPRESSUREINJURY_GEN_A_NUR
Patient is here for Epoetin injection. Hgb is 9.4 today and within parameters. Patient tolerated injection well, is aware of next appointment and was discharged in stable condition.   
no